# Patient Record
Sex: MALE | Race: WHITE
[De-identification: names, ages, dates, MRNs, and addresses within clinical notes are randomized per-mention and may not be internally consistent; named-entity substitution may affect disease eponyms.]

---

## 2020-12-09 ENCOUNTER — HOSPITAL ENCOUNTER (EMERGENCY)
Dept: HOSPITAL 46 - ED | Age: 45
Discharge: HOME | End: 2020-12-09
Payer: COMMERCIAL

## 2020-12-09 VITALS — WEIGHT: 123 LBS | BODY MASS INDEX: 21.79 KG/M2 | HEIGHT: 63 IN

## 2020-12-09 DIAGNOSIS — D64.9: ICD-10-CM

## 2020-12-09 DIAGNOSIS — T18.9XXA: Primary | ICD-10-CM

## 2020-12-09 DIAGNOSIS — Z87.891: ICD-10-CM

## 2020-12-09 DIAGNOSIS — Z79.899: ICD-10-CM

## 2024-02-21 ENCOUNTER — HOSPITAL ENCOUNTER (EMERGENCY)
Facility: CLINIC | Age: 49
Discharge: HOME | End: 2024-02-21
Attending: EMERGENCY MEDICINE | Admitting: EMERGENCY MEDICINE

## 2024-02-21 VITALS
TEMPERATURE: 99.8 F | RESPIRATION RATE: 18 BRPM | BODY MASS INDEX: 20.49 KG/M2 | HEART RATE: 84 BPM | DIASTOLIC BLOOD PRESSURE: 71 MMHG | SYSTOLIC BLOOD PRESSURE: 117 MMHG | WEIGHT: 120 LBS | OXYGEN SATURATION: 99 % | HEIGHT: 64 IN

## 2024-02-21 DIAGNOSIS — L02.211 CUTANEOUS ABSCESS OF ABDOMINAL WALL: ICD-10-CM

## 2024-02-21 LAB
ACANTHOCYTES BLD QL SMEAR: ABNORMAL
ANION GAP SERPL CALCULATED.3IONS-SCNC: 11 MMOL/L (ref 7–15)
AUER BODIES BLD QL SMEAR: ABNORMAL
BASO STIPL BLD QL SMEAR: ABNORMAL
BITE CELLS BLD QL SMEAR: ABNORMAL
BLISTER CELLS BLD QL SMEAR: ABNORMAL
BUN SERPL-MCNC: 6.5 MG/DL (ref 6–20)
BURR CELLS BLD QL SMEAR: ABNORMAL
CALCIUM SERPL-MCNC: 8.8 MG/DL (ref 8.6–10)
CHLORIDE SERPL-SCNC: 98 MMOL/L (ref 98–107)
CREAT SERPL-MCNC: 0.68 MG/DL (ref 0.67–1.17)
DACRYOCYTES BLD QL SMEAR: SLIGHT
DEPRECATED HCO3 PLAS-SCNC: 26 MMOL/L (ref 22–29)
EGFRCR SERPLBLD CKD-EPI 2021: >90 ML/MIN/1.73M2
ELLIPTOCYTES BLD QL SMEAR: SLIGHT
ERYTHROCYTE [DISTWIDTH] IN BLOOD BY AUTOMATED COUNT: 15.6 % (ref 10–15)
FRAGMENTS BLD QL SMEAR: ABNORMAL
GLUCOSE SERPL-MCNC: 97 MG/DL (ref 70–99)
HCT VFR BLD AUTO: 35.9 % (ref 40–53)
HGB BLD-MCNC: 11.3 G/DL (ref 13.3–17.7)
HGB C CRYSTALS: ABNORMAL
HOLD SPECIMEN: NORMAL
HOWELL-JOLLY BOD BLD QL SMEAR: ABNORMAL
MCH RBC QN AUTO: 19.3 PG (ref 26.5–33)
MCHC RBC AUTO-ENTMCNC: 31.5 G/DL (ref 31.5–36.5)
MCV RBC AUTO: 61 FL (ref 78–100)
NEUTS HYPERSEG BLD QL SMEAR: ABNORMAL
PLAT MORPH BLD: ABNORMAL
PLATELET # BLD AUTO: 347 10E3/UL (ref 150–450)
POLYCHROMASIA BLD QL SMEAR: ABNORMAL
POTASSIUM SERPL-SCNC: 3.8 MMOL/L (ref 3.4–5.3)
RBC # BLD AUTO: 5.86 10E6/UL (ref 4.4–5.9)
RBC AGGLUT BLD QL: ABNORMAL
RBC MORPH BLD: ABNORMAL
ROULEAUX BLD QL SMEAR: ABNORMAL
SICKLE CELLS BLD QL SMEAR: ABNORMAL
SMUDGE CELLS BLD QL SMEAR: ABNORMAL
SODIUM SERPL-SCNC: 135 MMOL/L (ref 135–145)
SPHEROCYTES BLD QL SMEAR: ABNORMAL
STOMATOCYTES BLD QL SMEAR: ABNORMAL
TARGETS BLD QL SMEAR: SLIGHT
TOXIC GRANULES BLD QL SMEAR: ABNORMAL
VARIANT LYMPHS BLD QL SMEAR: ABNORMAL
WBC # BLD AUTO: 14.1 10E3/UL (ref 4–11)

## 2024-02-21 PROCEDURE — 82374 ASSAY BLOOD CARBON DIOXIDE: CPT | Performed by: EMERGENCY MEDICINE

## 2024-02-21 PROCEDURE — 10060 I&D ABSCESS SIMPLE/SINGLE: CPT

## 2024-02-21 PROCEDURE — 96366 THER/PROPH/DIAG IV INF ADDON: CPT

## 2024-02-21 PROCEDURE — 96375 TX/PRO/DX INJ NEW DRUG ADDON: CPT

## 2024-02-21 PROCEDURE — 96376 TX/PRO/DX INJ SAME DRUG ADON: CPT

## 2024-02-21 PROCEDURE — 36415 COLL VENOUS BLD VENIPUNCTURE: CPT | Performed by: EMERGENCY MEDICINE

## 2024-02-21 PROCEDURE — 96365 THER/PROPH/DIAG IV INF INIT: CPT

## 2024-02-21 PROCEDURE — 250N000013 HC RX MED GY IP 250 OP 250 PS 637: Performed by: EMERGENCY MEDICINE

## 2024-02-21 PROCEDURE — 250N000011 HC RX IP 250 OP 636

## 2024-02-21 PROCEDURE — 99284 EMERGENCY DEPT VISIT MOD MDM: CPT | Mod: 25

## 2024-02-21 PROCEDURE — 85027 COMPLETE CBC AUTOMATED: CPT | Performed by: EMERGENCY MEDICINE

## 2024-02-21 PROCEDURE — 87070 CULTURE OTHR SPECIMN AEROBIC: CPT | Performed by: EMERGENCY MEDICINE

## 2024-02-21 PROCEDURE — 250N000011 HC RX IP 250 OP 636: Performed by: EMERGENCY MEDICINE

## 2024-02-21 RX ORDER — FENTANYL CITRATE 50 UG/ML
50 INJECTION, SOLUTION INTRAMUSCULAR; INTRAVENOUS ONCE
Status: COMPLETED | OUTPATIENT
Start: 2024-02-21 | End: 2024-02-21

## 2024-02-21 RX ORDER — CEFAZOLIN SODIUM 2 G/100ML
2 INJECTION, SOLUTION INTRAVENOUS ONCE
Status: COMPLETED | OUTPATIENT
Start: 2024-02-21 | End: 2024-02-21

## 2024-02-21 RX ORDER — DOXYCYCLINE 100 MG/1
100 CAPSULE ORAL 2 TIMES DAILY
Qty: 14 CAPSULE | Refills: 0 | Status: SHIPPED | OUTPATIENT
Start: 2024-02-21 | End: 2024-02-28

## 2024-02-21 RX ORDER — ACETAMINOPHEN 500 MG
1000 TABLET ORAL ONCE
Status: COMPLETED | OUTPATIENT
Start: 2024-02-21 | End: 2024-02-21

## 2024-02-21 RX ORDER — CEPHALEXIN 500 MG/1
500 CAPSULE ORAL 4 TIMES DAILY
Qty: 28 CAPSULE | Refills: 0 | Status: SHIPPED | OUTPATIENT
Start: 2024-02-21 | End: 2024-02-28

## 2024-02-21 RX ADMIN — ACETAMINOPHEN 1000 MG: 500 TABLET, FILM COATED ORAL at 14:26

## 2024-02-21 RX ADMIN — CEFAZOLIN SODIUM 2 G: 2 INJECTION, SOLUTION INTRAVENOUS at 16:29

## 2024-02-21 RX ADMIN — FENTANYL CITRATE 50 MCG: 50 INJECTION, SOLUTION INTRAMUSCULAR; INTRAVENOUS at 18:06

## 2024-02-21 RX ADMIN — FENTANYL CITRATE 50 MCG: 50 INJECTION, SOLUTION INTRAMUSCULAR; INTRAVENOUS at 16:29

## 2024-02-21 ASSESSMENT — COLUMBIA-SUICIDE SEVERITY RATING SCALE - C-SSRS
1. IN THE PAST MONTH, HAVE YOU WISHED YOU WERE DEAD OR WISHED YOU COULD GO TO SLEEP AND NOT WAKE UP?: NO
6. HAVE YOU EVER DONE ANYTHING, STARTED TO DO ANYTHING, OR PREPARED TO DO ANYTHING TO END YOUR LIFE?: NO
2. HAVE YOU ACTUALLY HAD ANY THOUGHTS OF KILLING YOURSELF IN THE PAST MONTH?: NO

## 2024-02-21 ASSESSMENT — ACTIVITIES OF DAILY LIVING (ADL)
ADLS_ACUITY_SCORE: 35
ADLS_ACUITY_SCORE: 33
ADLS_ACUITY_SCORE: 35

## 2024-02-21 NOTE — ED PROVIDER NOTES
Incision and Drainage     Procedure: Incision and Drainage     Consent: Verbal    Indication: Abscess    Location:  Lower abdomen    Size: 6 cm    Ultrasound Guidance: No    Preparation: Povidone-iodine     Anesthesia/Sedation: Bupivacaine      Procedure Detail:    Aspiration: No  Incision Type: Single straight  Scalpel: 11  Lesion Management: Probed and deloculated   Wound Management: Packing   Packing: Gauze, 1/2 inch iodoform     Patient Status: The patient tolerated the procedure well: No -Patient moved around a lot and was kicking. There were no complications.       Karthik, ALEKSANDR Guillory  02/21/24 9865

## 2024-02-21 NOTE — ED TRIAGE NOTES
Wound to lower abdomen x2 days. Redness and warmth noted. Pt reports hx of similar cyst and needed to be drained.      Triage Assessment (Adult)       Row Name 02/21/24 6135          Triage Assessment    Airway WDL WDL        Respiratory WDL    Respiratory WDL WDL        Skin Circulation/Temperature WDL    Skin Circulation/Temperature WDL WDL        Cardiac WDL    Cardiac WDL WDL        Peripheral/Neurovascular WDL    Peripheral Neurovascular WDL WDL        Cognitive/Neuro/Behavioral WDL    Cognitive/Neuro/Behavioral WDL WDL

## 2024-02-21 NOTE — ED PROVIDER NOTES
"  History     Chief Complaint:  Wound Check     HPI   Benjamin Vee is a 48 year old male who presents to the ED with a wound. Patient has an abscess to the low abdominal/suprapubic area that has been there for about 3 days. He has had one in the same area last month and notes that he had to have it drained at another hospital. Patient does not know if he has a history of MRSA. He expresses that he has not been eating well or taking care of himself.  The patient denies a history of diabetes.  The patient just finished eating some pop tarts here just prior to the evaluation.    Independent Historian:   None.    Review of External Notes:  None    Allergies:  No Known Allergies     Listed Medications:    No current outpatient medications on file.    Past Medical and Surgical History:    No past medical or surgical history on file.     Family History:    family history is not on file.    Social History:  No social history on file.    Physical Exam   Patient Vitals for the past 24 hrs:   BP Temp Temp src Pulse Resp SpO2 Height Weight   02/21/24 1545 117/71 -- -- -- -- 99 % -- --   02/21/24 1421 113/71 99.8  F (37.7  C) Temporal 119 18 98 % 1.626 m (5' 4\") 54.4 kg (120 lb)      Physical Exam  General:          Resting comfortably on the gurney  Head:              The scalp, face, and head appear normal  Eyes:               The pupils are equal, round, and reactive to light                          There is no nystagmus                          Extraocular muscles are intact                          Conjunctivae and sclerae are normal  ENT:                The nose is normal                          Pinnae are normal                          The oropharynx is normal                          Uvula is in the midline  Neck:              Normal range of motion                          There is no rigidity noted                          There is no midline cervical spine pain/tenderness                          Trachea is in " the midline                          No mass is detected  CV:                  Regular rate and underlying rhythm                           Normal S1/S2, no S3/S4                          No pathological murmur detected  Resp:              Lungs are clear                          There is no tachypnea                          Non-labored                          No rales                          No wheezing   GI:                   Abdomen is soft, there is no rigidity                          No distension                          No tympani                          No rebound tenderness                           Non-surgical without peritoneal features  MS:                  Normal muscular tone                          Symmetric motor strength                          No major joint effusions                          No asymmetric leg swelling, no calf tenderness  Skin:               6 cm by 4 cm abscess in the super pubic region. The inferior aspect is approximately 2 cm above the                          penis. The penis and scrotum are not involved.  There is no evidence of Maryjane's gangrene.  There is an area of chronic dermatitis just superior to the abscess.  This is slightly scaly.  The abscess below the dermatitis has the appearance of a large carbuncle/abscess.  Neuro:            Speech is normal and fluent  Psych:            Awake. Alert.                            Normal affect.  Appropriate interactions.          Emergency Department Course   Laboratory:  Labs Ordered and Resulted from Time of ED Arrival to Time of ED Departure   CBC WITH PLATELETS - Abnormal       Result Value    WBC Count 14.1 (*)     RBC Count 5.86      Hemoglobin 11.3 (*)     Hematocrit 35.9 (*)     MCV 61 (*)     MCH 19.3 (*)     MCHC 31.5      RDW 15.6 (*)     Platelet Count 347     RBC AND PLATELET MORPHOLOGY - Abnormal    Platelet Assessment        Value: Automated Count Confirmed. Platelet morphology is normal.     Acanthocytes        Belén Rods        Basophilic Stippling        Bite Cells        Blister Cells        [place] Cells        Elliptocytes Slight (*)     Hgb C Crystals        Grant-Jolly Bodies        Hypersegmented Neutrophils        Polychromasia        RBC agglutination        RBC Fragments        Reactive Lymphocytes        Rouleaux        Sickle Cells        Smudge Cells        Spherocytes        Stomatocytes        Target Cells Slight (*)     Teardrop Cells Slight (*)     Toxic Neutrophils        RBC Morphology Confirmed RBC Indices     BASIC METABOLIC PANEL - Normal    Sodium 135      Potassium 3.8      Chloride 98      Carbon Dioxide (CO2) 26      Anion Gap 11      Urea Nitrogen 6.5      Creatinine 0.68      GFR Estimate >90      Calcium 8.8      Glucose 97     AEROBIC BACTERIAL CULTURE ROUTINE      Procedures:  Procedures   Incision and drainage of abscess  The area around the abscess was anesthetized with Sensorcaine without epinephrine.  Routine prep with Betadine was performed.  The laceration is drained after a straight incision with an 11 blade.  Loculations were broken up.  This is packed with Nu Gauze.  The physician assistant performed the procedure under my guidance and supervision.    Emergency Department Course & Assessments:  Interventions/ED Medications:  Medications   acetaminophen (TYLENOL) tablet 1,000 mg (1,000 mg Oral $Given 2/21/24 1420)      Independent Interpretation of Radiology Studies by Dr. Wall  None    Assessments/Consultations/Discussion of Management:  ED Course as of 02/21/24 1904   Wed Feb 21, 2024   7018 I obtained history and examined the patient as noted above.     Disposition:  Home    Impression & Plan    Medical Decision Making:  This patient presents to the emergency department with an abscess in the suprapubic region.  He has a nearby eczematous rash that is slightly itchy that may have been an antecedent to this abscess.  He had another abscess in the area in the recent  past.  There is no cellulitis but there is a mature looking abscess as shown in the picture above.  There is no clear history of MRSA, but it is in the differential diagnosis.  The patient underwent incision and drainage of the abscess with packing and he will be started on cephalexin and doxycycline empirically at this juncture, pending culture.  Packing should be removed in 1 to 2 days.    Diagnosis:    ICD-10-CM    1. Cutaneous abscess of abdominal wall  L02.211 Primary Care Referral             Discharge Medications (if applicable):  New Prescriptions    CEPHALEXIN (KEFLEX) 500 MG CAPSULE    Take 1 capsule (500 mg) by mouth 4 times daily for 7 days    DOXYCYCLINE HYCLATE (VIBRAMYCIN) 100 MG CAPSULE    Take 1 capsule (100 mg) by mouth 2 times daily for 7 days        Scribe Disclosure:  Armani BARROW, am serving as a scribe at 4:09 PM on 2/21/2024 to document services personally performed by Antonio Wall MD based on my observations and the provider's statements to me.     2/21/2024   Antonio Wall MD Rock, Michael P, MD  02/21/24 4562

## 2024-02-22 NOTE — DISCHARGE INSTRUCTIONS
Keflex 4 times a day for 1 week  Doxycycline 2 times a day for 1 week  If antibiotics need to be changed, you will be contacted  Take packing out of the wound in 36 to 48 hours  Follow-up with your primary care provider in the next 5 days for recheck of the wound and to ensure that there is improvement and no recollection of the fluid

## 2024-02-23 LAB — BACTERIA ABSC ANAEROBE+AEROBE CULT: ABNORMAL

## 2024-02-24 ENCOUNTER — TELEPHONE (OUTPATIENT)
Dept: EMERGENCY MEDICINE | Facility: CLINIC | Age: 49
End: 2024-02-24

## 2024-02-24 NOTE — TELEPHONE ENCOUNTER
[facility]    Reason for call: Lab Result Notification     Lab Result (including Rx patient on, if applicable).  If culture, copy of lab report at bottom.  Lab Result: Final Abscess Aerobic Bacterial Culture (specimen - Abdomen) report on 2/23/24     [facility] Emergency Dept discharge antibiotic prescribed: Cephalexin (Keflex) 500 mg capsule, 1 capsule (500 mg) by mouth 4 times daily for 7 days and Doxycycline 100 mg PO tablet, 1 tablet (100 mg) by mouth 2 times daily for 7 days     #1. Bacteria, 3+ Methicillin resistant Staphylococcus aureus (MRSA)  ,  is SUSCEPTIBLE to antibiotic.     Incision and Drainage performed in the [place] ED: Yes  Recommendations in treatment per [facility] ED lab result culture protocol    Creatinine Level (mg/dl)   Creatinine   Date Value Ref Range Status   02/21/2024 0.68 0.67 - 1.17 mg/dL Final    Creatinine clearance (ml/min), if applicable    Serum creatinine: 0.68 mg/dL 02/21/24 1548  Estimated creatinine clearance: 102.2 mL/min     Patient's current Symptoms:   No Phone number on file. Letter Sent       Misael Savage RN

## 2024-02-24 NOTE — LETTER
February 24, 2024        Benjamin Gee  [address]  [facility] [address]          Dear Benjamin Gee:    You were seen in the [facility] Emergency Department at [facility] EMERGENCY DEPT on 2/21/2024.  We are unable to reach you by phone, so we are sending you this letter.     It is important that you call [facility] Emergency Department lab result nurse at [phone], as we have information to relay to you AND/OR we MAY have to make some changes in your treatment.    Best time to call back is between 9AM and 5:30PM, 7 days a week.      Sincerely,     [facility] Emergency Department Lab Result RN  [phone]

## 2024-03-15 ENCOUNTER — HOSPITAL ENCOUNTER (EMERGENCY)
Facility: CLINIC | Age: 49
Discharge: HOME | End: 2024-03-16
Attending: EMERGENCY MEDICINE | Admitting: EMERGENCY MEDICINE

## 2024-03-15 ENCOUNTER — APPOINTMENT (OUTPATIENT)
Dept: GENERAL RADIOLOGY | Facility: CLINIC | Age: 49
End: 2024-03-15
Attending: EMERGENCY MEDICINE

## 2024-03-15 DIAGNOSIS — R07.9 CHEST PAIN, UNSPECIFIED TYPE: ICD-10-CM

## 2024-03-15 LAB
ALBUMIN SERPL BCG-MCNC: 4.3 G/DL (ref 3.5–5.2)
ALP SERPL-CCNC: 92 U/L (ref 40–150)
ALT SERPL W P-5'-P-CCNC: 19 U/L (ref 0–70)
ANION GAP SERPL CALCULATED.3IONS-SCNC: 12 MMOL/L (ref 7–15)
AST SERPL W P-5'-P-CCNC: 17 U/L (ref 0–45)
BASOPHILS # BLD AUTO: 0.1 10E3/UL (ref 0–0.2)
BASOPHILS NFR BLD AUTO: 1 %
BILIRUB SERPL-MCNC: 0.6 MG/DL
BUN SERPL-MCNC: 10.3 MG/DL (ref 6–20)
CALCIUM SERPL-MCNC: 8.7 MG/DL (ref 8.6–10)
CHLORIDE SERPL-SCNC: 101 MMOL/L (ref 98–107)
CREAT SERPL-MCNC: 0.88 MG/DL (ref 0.67–1.17)
DEPRECATED HCO3 PLAS-SCNC: 29 MMOL/L (ref 22–29)
EGFRCR SERPLBLD CKD-EPI 2021: >90 ML/MIN/1.73M2
EOSINOPHIL # BLD AUTO: 0.2 10E3/UL (ref 0–0.7)
EOSINOPHIL NFR BLD AUTO: 2 %
ERYTHROCYTE [DISTWIDTH] IN BLOOD BY AUTOMATED COUNT: 17.6 % (ref 10–15)
GLUCOSE SERPL-MCNC: 72 MG/DL (ref 70–99)
HCT VFR BLD AUTO: 39.1 % (ref 40–53)
HGB BLD-MCNC: 12.1 G/DL (ref 13.3–17.7)
IMM GRANULOCYTES # BLD: 0 10E3/UL
IMM GRANULOCYTES NFR BLD: 0 %
LYMPHOCYTES # BLD AUTO: 2 10E3/UL (ref 0.8–5.3)
LYMPHOCYTES NFR BLD AUTO: 26 %
MCH RBC QN AUTO: 19.1 PG (ref 26.5–33)
MCHC RBC AUTO-ENTMCNC: 30.9 G/DL (ref 31.5–36.5)
MCV RBC AUTO: 62 FL (ref 78–100)
MONOCYTES # BLD AUTO: 1 10E3/UL (ref 0–1.3)
MONOCYTES NFR BLD AUTO: 13 %
NEUTROPHILS # BLD AUTO: 4.5 10E3/UL (ref 1.6–8.3)
NEUTROPHILS NFR BLD AUTO: 58 %
NRBC # BLD AUTO: 0 10E3/UL
NRBC BLD AUTO-RTO: 0 /100
PLATELET # BLD AUTO: 396 10E3/UL (ref 150–450)
POTASSIUM SERPL-SCNC: 3.4 MMOL/L (ref 3.4–5.3)
PROT SERPL-MCNC: 7.4 G/DL (ref 6.4–8.3)
RBC # BLD AUTO: 6.34 10E6/UL (ref 4.4–5.9)
SODIUM SERPL-SCNC: 142 MMOL/L (ref 135–145)
TROPONIN T SERPL HS-MCNC: 7 NG/L
TROPONIN T SERPL HS-MCNC: 7 NG/L
WBC # BLD AUTO: 7.7 10E3/UL (ref 4–11)

## 2024-03-15 PROCEDURE — 85025 COMPLETE CBC W/AUTO DIFF WBC: CPT | Performed by: EMERGENCY MEDICINE

## 2024-03-15 PROCEDURE — 36415 COLL VENOUS BLD VENIPUNCTURE: CPT | Performed by: EMERGENCY MEDICINE

## 2024-03-15 PROCEDURE — 80053 COMPREHEN METABOLIC PANEL: CPT | Performed by: EMERGENCY MEDICINE

## 2024-03-15 PROCEDURE — 99285 EMERGENCY DEPT VISIT HI MDM: CPT | Mod: 25

## 2024-03-15 PROCEDURE — 71046 X-RAY EXAM CHEST 2 VIEWS: CPT

## 2024-03-15 PROCEDURE — 93005 ELECTROCARDIOGRAM TRACING: CPT

## 2024-03-15 PROCEDURE — 84484 ASSAY OF TROPONIN QUANT: CPT | Performed by: EMERGENCY MEDICINE

## 2024-03-15 ASSESSMENT — COLUMBIA-SUICIDE SEVERITY RATING SCALE - C-SSRS
2. HAVE YOU ACTUALLY HAD ANY THOUGHTS OF KILLING YOURSELF IN THE PAST MONTH?: NO
1. IN THE PAST MONTH, HAVE YOU WISHED YOU WERE DEAD OR WISHED YOU COULD GO TO SLEEP AND NOT WAKE UP?: NO
6. HAVE YOU EVER DONE ANYTHING, STARTED TO DO ANYTHING, OR PREPARED TO DO ANYTHING TO END YOUR LIFE?: NO

## 2024-03-15 ASSESSMENT — ACTIVITIES OF DAILY LIVING (ADL)
ADLS_ACUITY_SCORE: 35

## 2024-03-16 VITALS
HEART RATE: 88 BPM | DIASTOLIC BLOOD PRESSURE: 61 MMHG | OXYGEN SATURATION: 99 % | TEMPERATURE: 98.1 F | RESPIRATION RATE: 18 BRPM | SYSTOLIC BLOOD PRESSURE: 108 MMHG | BODY MASS INDEX: 18.78 KG/M2 | HEIGHT: 64 IN | WEIGHT: 110 LBS

## 2024-03-16 NOTE — ED PROVIDER NOTES
"  History     Chief Complaint:  Chest Pain       HPI   Benjamin Gee is a 48 year old male with history of asthma who presents to the ED for evaluation of chest pain. He reports having dull chest pain earlier today while sitting down and eating. The pain radiates outwards. He reports shortness of breath, nausea, chills today but denies vomiting, diarrhea, leg swelling, or fever. He was told he had heart \"blockages\" a month ago and never got it taken care of. No stents or bypass surgeries. No history of cancer. Family history of heart attacks.     Independent Historian:    None - Patient only     Review of External Notes:  I reviewed the outside note from 2/29/24 -seen for chest pain in the ED and discharged after a reassuring workup    Medications:    Zofran  Prednisone  Zyrtec     Past Medical History:    Borderline personality disorder  Anemia  Hypoglycemia  Depression with suicidal ideation   Asthma     Past Surgical History:    Hernia repair      Physical Exam   Patient Vitals for the past 24 hrs:   BP Temp Temp src Pulse Resp SpO2 Height Weight   03/15/24 2245 105/69 -- -- 93 -- 100 % -- --   03/15/24 2111 -- -- -- -- -- -- 1.626 m (5' 4\") 49.9 kg (110 lb)   03/15/24 2110 -- 98.1  F (36.7  C) Oral -- -- -- -- --   03/15/24 2041 104/66 97.5  F (36.4  C) Temporal 97 18 99 % -- --      Physical Exam  General: Laying on the ED bed  HEENT: Normocephalic, atraumatic  Cardiac: Radial pulses 2+, regular rate and rhythm  Pulm: Breathing comfortably, no accessory muscle usage, no conversational dyspnea, and lungs clear bilaterally  GI: Abdomen soft, nontender, no rigidity or guarding  MSK: No bony deformities, no calf tenderness BLE, no edema BLE  Skin: Warm and dry  Neuro: Moves all extremities  Psych: Pleasant mood and affect      Emergency Department Course   ECG  ECG results from 03/15/24   EKG 12 lead     Value    Systolic Blood Pressure     Diastolic Blood Pressure     Ventricular Rate 93    Atrial Rate 93    " CT Interval 162    QRS Duration 78        QTc 422    P Axis 62    R AXIS 28    T Axis 54    Interpretation ECG      Sinus rhythm  Normal ECG  No previous ECGs available       Imaging:  XR Chest 2 Views   Final Result   IMPRESSION: No focal consolidation, pleural effusion or pneumothorax. Heart size and pulmonary vascularity are normal.        Report per radiology    Laboratory:  Labs Ordered and Resulted from Time of ED Arrival to Time of ED Departure   CBC WITH PLATELETS AND DIFFERENTIAL - Abnormal       Result Value    WBC Count 7.7      RBC Count 6.34 (*)     Hemoglobin 12.1 (*)     Hematocrit 39.1 (*)     MCV 62 (*)     MCH 19.1 (*)     MCHC 30.9 (*)     RDW 17.6 (*)     Platelet Count 396      % Neutrophils 58      % Lymphocytes 26      % Monocytes 13      % Eosinophils 2      % Basophils 1      % Immature Granulocytes 0      NRBCs per 100 WBC 0      Absolute Neutrophils 4.5      Absolute Lymphocytes 2.0      Absolute Monocytes 1.0      Absolute Eosinophils 0.2      Absolute Basophils 0.1      Absolute Immature Granulocytes 0.0      Absolute NRBCs 0.0     TROPONIN T, HIGH SENSITIVITY - Normal    Troponin T, High Sensitivity 7     COMPREHENSIVE METABOLIC PANEL - Normal    Sodium 142      Potassium 3.4      Carbon Dioxide (CO2) 29      Anion Gap 12      Urea Nitrogen 10.3      Creatinine 0.88      GFR Estimate >90      Calcium 8.7      Chloride 101      Glucose 72      Alkaline Phosphatase 92      AST 17      ALT 19      Protein Total 7.4      Albumin 4.3      Bilirubin Total 0.6     TROPONIN T, HIGH SENSITIVITY - Normal    Troponin T, High Sensitivity 7        Procedures   None    Emergency Department Course & Assessments:    Interventions:  Medications - No data to display     Independent Interpretation (X-rays, CTs, rhythm strip):  Chest x-ray on my review is clear without lobar infiltrate, pneumothorax, large pleural effusion, or significant edema.     Assessments/Consultations/Discussion of Management  or Tests:  ED Course as of 24 0003   Fri Mar 15, 2024   2126 I obtained history and examined the patient as noted above.    8 I rechecked the patient and explained findings.    235 I prepared the patient to be discharged home.      Social Determinants of Health affecting care:  None     Disposition:  The patient was discharged.    Impression & Plan      Medical Decision Makin-year-old male presents with chest pain as above.  Vital signs are stable and exam is nonfocal.  Equal pulses and no acute hypertension, low suspicion for aortic emergency. EKG is nonischemic and troponin is negative x 2, low suspicion for ACS.  PERC negative, low suspicion for PE.  No other emergent structural findings on chest x-ray.  Overall reassuring workup in the ED.  No further complaints on reevaluation.  Plan is for discharge home with PCP follow-up for further care.    Diagnosis:    ICD-10-CM    1. Chest pain, unspecified type  R07.9               Scribe Disclosure:  I, Pretty Benavides, am serving as a scribe at 9:28 PM on 3/15/2024 to document services personally performed by Jayy Tapia MD based on my observations and the provider's statements to me.  3/15/2024   Jayy Tapia MD King, Colin, MD  24 0003

## 2024-03-16 NOTE — ED TRIAGE NOTES
"Arrives from home. States he has been having chest pain, which started earlier today. States he has a history of heart \"blockages\" which he never took care of and is concerned now about the pain.     Of note during the triage process patient is on his phone and playing games the entire time.     "

## 2024-03-18 LAB
ATRIAL RATE - MUSE: 93 BPM
DIASTOLIC BLOOD PRESSURE - MUSE: NORMAL MMHG
INTERPRETATION ECG - MUSE: NORMAL
P AXIS - MUSE: 62 DEGREES
PR INTERVAL - MUSE: 162 MS
QRS DURATION - MUSE: 78 MS
QT - MUSE: 340 MS
QTC - MUSE: 422 MS
R AXIS - MUSE: 28 DEGREES
SYSTOLIC BLOOD PRESSURE - MUSE: NORMAL MMHG
T AXIS - MUSE: 54 DEGREES
VENTRICULAR RATE- MUSE: 93 BPM